# Patient Record
(demographics unavailable — no encounter records)

---

## 2024-11-19 NOTE — HISTORY OF PRESENT ILLNESS
[de-identified] : Patient presents for LT ankle pain evaluation. Patient states that he injured his LT ankle in wrestling 10/22/2024. Patient states that he hasn't been playing sports since the injury. Patient went to [facility] same day and was told he had a sprain. Patient states that he no longer has pain and is looking to get sports clearance.

## 2024-11-19 NOTE — PHYSICAL EXAM
[5___] : eversion 5[unfilled]/5 [2+] : posterior tibialis pulse: 2+ [] : patient ambulates without assistive device [Left] : left ankle [Outside films reviewed] : Outside films reviewed [There are no fractures, subluxations or dislocations. No significant abnormalities are seen] : There are no fractures, subluxations or dislocations. No significant abnormalities are seen

## 2024-11-19 NOTE — DISCUSSION/SUMMARY
[de-identified] : I reviewed patient's radiographs and discussed his condition and treatment options with patient and his mother.  He may resume activities as tolerated.   Follow up as needed.  Patient voiced understanding and agreement with the plan.

## 2024-11-19 NOTE — REASON FOR VISIT
[Parent] : parent [FreeTextEntry2] : LT ankle pain DOI 10/22/2024 [Interpreters_IDNumber] : [id number] [Interpreters_FullName] : Eleanor [TWNoteComboBox1] : [place]